# Patient Record
Sex: FEMALE | Race: WHITE | Employment: FULL TIME | URBAN - METROPOLITAN AREA
[De-identification: names, ages, dates, MRNs, and addresses within clinical notes are randomized per-mention and may not be internally consistent; named-entity substitution may affect disease eponyms.]

---

## 2017-01-26 ENCOUNTER — HOSPITAL ENCOUNTER (OUTPATIENT)
Dept: MAMMOGRAPHY | Facility: CLINIC | Age: 57
Discharge: HOME | End: 2017-01-26
Attending: OBSTETRICS & GYNECOLOGY | Admitting: OBSTETRICS & GYNECOLOGY
Payer: COMMERCIAL

## 2017-01-26 DIAGNOSIS — Z12.31 VISIT FOR SCREENING MAMMOGRAM: ICD-10-CM

## 2017-01-26 PROCEDURE — G0202 SCR MAMMO BI INCL CAD: HCPCS

## 2018-05-04 ENCOUNTER — HEALTH MAINTENANCE LETTER (OUTPATIENT)
Age: 58
End: 2018-05-04

## 2018-05-17 ENCOUNTER — HOSPITAL ENCOUNTER (OUTPATIENT)
Dept: MAMMOGRAPHY | Facility: CLINIC | Age: 58
Discharge: HOME | End: 2018-05-17
Attending: OBSTETRICS & GYNECOLOGY | Admitting: OBSTETRICS & GYNECOLOGY
Payer: COMMERCIAL

## 2018-05-17 DIAGNOSIS — Z12.31 VISIT FOR SCREENING MAMMOGRAM: ICD-10-CM

## 2018-05-17 PROCEDURE — 77063 BREAST TOMOSYNTHESIS BI: CPT

## 2019-05-20 ENCOUNTER — HOSPITAL ENCOUNTER (OUTPATIENT)
Dept: MAMMOGRAPHY | Facility: CLINIC | Age: 59
Discharge: HOME | End: 2019-05-20
Attending: OBSTETRICS & GYNECOLOGY | Admitting: OBSTETRICS & GYNECOLOGY
Payer: COMMERCIAL

## 2019-05-20 DIAGNOSIS — Z12.31 VISIT FOR SCREENING MAMMOGRAM: ICD-10-CM

## 2019-05-20 PROCEDURE — 77063 BREAST TOMOSYNTHESIS BI: CPT

## 2020-06-10 ENCOUNTER — HOSPITAL ENCOUNTER (OUTPATIENT)
Dept: MAMMOGRAPHY | Facility: CLINIC | Age: 60
Discharge: HOME | End: 2020-06-10
Attending: OBSTETRICS & GYNECOLOGY | Admitting: OBSTETRICS & GYNECOLOGY
Payer: COMMERCIAL

## 2020-06-10 DIAGNOSIS — Z12.31 SCREENING MAMMOGRAM, ENCOUNTER FOR: ICD-10-CM

## 2020-06-10 PROCEDURE — 77063 BREAST TOMOSYNTHESIS BI: CPT

## 2023-07-18 ENCOUNTER — LAB REQUISITION (OUTPATIENT)
Dept: LAB | Facility: CLINIC | Age: 63
End: 2023-07-18
Payer: COMMERCIAL

## 2023-07-18 DIAGNOSIS — R21 RASH AND OTHER NONSPECIFIC SKIN ERUPTION: ICD-10-CM

## 2023-07-18 DIAGNOSIS — L08.9 LOCAL INFECTION OF THE SKIN AND SUBCUTANEOUS TISSUE, UNSPECIFIED: ICD-10-CM

## 2023-07-18 PROCEDURE — 87070 CULTURE OTHR SPECIMN AEROBIC: CPT | Mod: ORL | Performed by: DERMATOLOGY

## 2023-07-20 LAB — BACTERIA WND CULT: NO GROWTH

## 2023-12-02 ENCOUNTER — WEB ENCOUNTER (OUTPATIENT)
Dept: URBAN - METROPOLITAN AREA CLINIC 6 | Facility: CLINIC | Age: 63
End: 2023-12-02

## 2023-12-05 ENCOUNTER — WEB ENCOUNTER (OUTPATIENT)
Dept: URBAN - METROPOLITAN AREA CLINIC 9 | Facility: CLINIC | Age: 63
End: 2023-12-05

## 2023-12-26 ENCOUNTER — OFFICE VISIT (OUTPATIENT)
Dept: URBAN - METROPOLITAN AREA CLINIC 9 | Facility: CLINIC | Age: 63
End: 2023-12-26
Payer: COMMERCIAL

## 2023-12-26 VITALS
DIASTOLIC BLOOD PRESSURE: 70 MMHG | WEIGHT: 132 LBS | HEIGHT: 65 IN | SYSTOLIC BLOOD PRESSURE: 99 MMHG | BODY MASS INDEX: 21.99 KG/M2

## 2023-12-26 DIAGNOSIS — R13.10 DYSPHAGIA, UNSPECIFIED TYPE: ICD-10-CM

## 2023-12-26 DIAGNOSIS — K21.9 GASTROESOPHAGEAL REFLUX DISEASE, UNSPECIFIED WHETHER ESOPHAGITIS PRESENT: ICD-10-CM

## 2023-12-26 DIAGNOSIS — R09.89 THROAT CLEARING: ICD-10-CM

## 2023-12-26 PROCEDURE — 99204 OFFICE O/P NEW MOD 45 MIN: CPT | Performed by: STUDENT IN AN ORGANIZED HEALTH CARE EDUCATION/TRAINING PROGRAM

## 2023-12-26 RX ORDER — LEVOTHYROXINE SODIUM 0.05 MG/1
1 TABLET IN THE MORNING ON AN EMPTY STOMACH TABLET ORAL ONCE A DAY
Qty: 90 TABLET | Refills: 0 | Status: ACTIVE | COMMUNITY

## 2023-12-26 RX ORDER — KETOCONAZOLE 20 MG/ML
SHAMPOO, SUSPENSION TOPICAL
Qty: 120 MILLILITER | Refills: 0 | Status: ACTIVE | COMMUNITY

## 2023-12-26 RX ORDER — LEVOTHYROXINE SODIUM 0.05 MG/1
TAKE 1 TABLET BY MOUTH DAILY ON AN EMPTY STOMACH TABLET ORAL
Qty: 90 EACH | Refills: 0 | Status: DISCONTINUED | COMMUNITY

## 2023-12-26 RX ORDER — MELOXICAM 7.5 MG/1
1 TABLET TABLET ORAL AS NEEDED
Refills: 2 | Status: ACTIVE | COMMUNITY

## 2023-12-26 RX ORDER — CLOBETASOL PROPIONATE 0.5 MG/ML
SOLUTION TOPICAL
Qty: 50 MILLILITER | Refills: 0 | Status: ACTIVE | COMMUNITY

## 2023-12-26 RX ORDER — ALENDRONATE SODIUM 70 MG/1
TAKE 1 TABLET BY MOUTH EVERY WEEK TABLET ORAL
Qty: 12 EACH | Refills: 1 | Status: DISCONTINUED | COMMUNITY

## 2023-12-26 NOTE — HPI-TODAY'S VISIT:
Patient has a history of GERD, thyroid disorder/nodule,  who presents for swollen esophagus  GI Hx: 12/26/23- Spetekat started having clearing her throat and went to ENT and was told it was a swollen esophagus.  Takes alot of vitamins supplements. Worse after eats. Doesnt have classic acid reflux symtoms. Chronic throat clearing and mild feeling of  things getting caught in back of throat.  ROS includes and is negative for the below, unless specified positive above: Denies weight loss, fever, chills, abdominal pain, nausea, vomiting, diarrhea.  Denies dysphagia, reflux, UGI symptoms. Denies hematemesis, melena, hematochezia, blood per rectum.  Family hx: No GI cancers or IBD  EGD: 7 years ago- had dilation, no records.   Colonoscopy: 6/2010- OSH- [facility]. Repeat 10 years.  7/2020- OSH- normal. Repeat 10 years.  Imaging/Studies/Procedures: 6/9/23- CMP, TSH normal. Thyroid U/S 12/4/23- two moderately suspicious thyroid lesions CXR 12/16/23- lungs with hyperinflation and mild nodular pleural thickening.

## 2024-01-05 ENCOUNTER — WEB ENCOUNTER (OUTPATIENT)
Dept: URBAN - METROPOLITAN AREA CLINIC 9 | Facility: CLINIC | Age: 64
End: 2024-01-05

## 2024-02-05 ENCOUNTER — LAB (OUTPATIENT)
Dept: URBAN - METROPOLITAN AREA CLINIC 4 | Facility: CLINIC | Age: 64
End: 2024-02-05
Payer: COMMERCIAL

## 2024-02-05 ENCOUNTER — EGD W/ DIL (OUTPATIENT)
Dept: URBAN - METROPOLITAN AREA SURGERY CENTER 9 | Facility: SURGERY CENTER | Age: 64
End: 2024-02-05
Payer: COMMERCIAL

## 2024-02-05 DIAGNOSIS — K29.70 GASTRITIS, UNSPECIFIED, WITHOUT BLEEDING: ICD-10-CM

## 2024-02-05 DIAGNOSIS — K29.70 GASTRITIS WITHOUT BLEEDING, UNSPECIFIED CHRONICITY, UNSPECIFIED GASTRITIS TYPE: ICD-10-CM

## 2024-02-05 DIAGNOSIS — K31.7 GASTRIC POLYP: ICD-10-CM

## 2024-02-05 DIAGNOSIS — K31.89 OTHER DISEASES OF STOMACH AND DUODENUM: ICD-10-CM

## 2024-02-05 DIAGNOSIS — K44.9 DIAPHRAGMATIC HERNIA WITHOUT OBSTRUCTION OR GANGRENE: ICD-10-CM

## 2024-02-05 PROCEDURE — 88305 TISSUE EXAM BY PATHOLOGIST: CPT | Performed by: PATHOLOGY

## 2024-02-05 PROCEDURE — 88342 IMHCHEM/IMCYTCHM 1ST ANTB: CPT | Performed by: PATHOLOGY

## 2024-02-05 PROCEDURE — 88341 IMHCHEM/IMCYTCHM EA ADD ANTB: CPT | Performed by: PATHOLOGY

## 2024-02-05 PROCEDURE — 43239 EGD BIOPSY SINGLE/MULTIPLE: CPT | Performed by: STUDENT IN AN ORGANIZED HEALTH CARE EDUCATION/TRAINING PROGRAM

## 2024-02-05 RX ORDER — MELOXICAM 7.5 MG/1
1 TABLET TABLET ORAL AS NEEDED
Refills: 2 | Status: ACTIVE | COMMUNITY

## 2024-02-05 RX ORDER — LEVOTHYROXINE SODIUM 0.05 MG/1
1 TABLET IN THE MORNING ON AN EMPTY STOMACH TABLET ORAL ONCE A DAY
Qty: 90 TABLET | Refills: 0 | Status: ACTIVE | COMMUNITY

## 2024-02-05 RX ORDER — KETOCONAZOLE 20 MG/ML
SHAMPOO, SUSPENSION TOPICAL
Qty: 120 MILLILITER | Refills: 0 | Status: ACTIVE | COMMUNITY

## 2024-02-05 RX ORDER — CLOBETASOL PROPIONATE 0.5 MG/ML
SOLUTION TOPICAL
Qty: 50 MILLILITER | Refills: 0 | Status: ACTIVE | COMMUNITY

## 2024-04-19 ENCOUNTER — OV EP (OUTPATIENT)
Dept: URBAN - METROPOLITAN AREA CLINIC 9 | Facility: CLINIC | Age: 64
End: 2024-04-19
Payer: COMMERCIAL

## 2024-04-19 VITALS
DIASTOLIC BLOOD PRESSURE: 80 MMHG | BODY MASS INDEX: 21.66 KG/M2 | HEIGHT: 65 IN | SYSTOLIC BLOOD PRESSURE: 110 MMHG | WEIGHT: 130 LBS

## 2024-04-19 DIAGNOSIS — K44.9 HIATAL HERNIA: ICD-10-CM

## 2024-04-19 DIAGNOSIS — R13.10 DYSPHAGIA, UNSPECIFIED TYPE: ICD-10-CM

## 2024-04-19 DIAGNOSIS — K21.9 GASTROESOPHAGEAL REFLUX DISEASE, UNSPECIFIED WHETHER ESOPHAGITIS PRESENT: ICD-10-CM

## 2024-04-19 DIAGNOSIS — R09.89 THROAT CLEARING: ICD-10-CM

## 2024-04-19 PROCEDURE — 99213 OFFICE O/P EST LOW 20 MIN: CPT | Performed by: STUDENT IN AN ORGANIZED HEALTH CARE EDUCATION/TRAINING PROGRAM

## 2024-04-19 RX ORDER — KETOCONAZOLE 20 MG/ML
SHAMPOO, SUSPENSION TOPICAL
Qty: 120 MILLILITER | Refills: 0 | Status: ACTIVE | COMMUNITY

## 2024-04-19 RX ORDER — MELOXICAM 7.5 MG/1
1 TABLET TABLET ORAL AS NEEDED
Refills: 2 | Status: ACTIVE | COMMUNITY

## 2024-04-19 RX ORDER — CLOBETASOL PROPIONATE 0.5 MG/ML
SOLUTION TOPICAL
Qty: 50 MILLILITER | Refills: 0 | Status: ACTIVE | COMMUNITY

## 2024-04-19 RX ORDER — LEVOTHYROXINE SODIUM 0.05 MG/1
1 TABLET IN THE MORNING ON AN EMPTY STOMACH TABLET ORAL ONCE A DAY
Qty: 90 TABLET | Refills: 0 | Status: ACTIVE | COMMUNITY

## 2024-04-19 NOTE — HPI-TODAY'S VISIT:
Patient has a history of possible GERD, thyroid disorder/nodule,  who presents for follow up  GI Hx: 12/26/23- Luistekat started having clearing her throat and went to ENT and was told it was a swollen esophagus.  Takes alot of vitamins supplements. Worse after eats. Doesnt have classic acid reflux symtoms. Chronic throat clearing and mild feeling of  things getting caught in back of throat. 4/19/24- has been watching diet more. Still no true acid reflux-- rare symptoms. Has more throat clearing as a main symptoms. Feels some mucous in the back of her throat.  ROS includes and is negative for the below, unless specified positive above: Denies weight loss, fever, chills, abdominal pain, nausea, vomiting, diarrhea.  Denies dysphagia, reflux, UGI symptoms. Denies hematemesis, melena, hematochezia, blood per rectum.  Family hx: No GI cancers or IBD  EGD: 7 years ago- had dilation, no records.   Colonoscopy: 6/2010- OSH- [facility]. Repeat 10 years.  7/2020- OSH- normal. Repeat 10 years.  Imaging/Studies/Procedures: 6/9/23- CMP, TSH normal. Thyroid U/S 12/4/23- two moderately suspicious thyroid lesions CXR 12/16/23- lungs with hyperinflation and mild nodular pleural thickening.